# Patient Record
Sex: MALE
[De-identification: names, ages, dates, MRNs, and addresses within clinical notes are randomized per-mention and may not be internally consistent; named-entity substitution may affect disease eponyms.]

---

## 2021-12-17 ENCOUNTER — HOSPITAL ENCOUNTER (EMERGENCY)
Dept: HOSPITAL 5 - ED | Age: 31
LOS: 1 days | Discharge: LEFT BEFORE DISCHARGE | End: 2021-12-18
Payer: SELF-PAY

## 2021-12-17 ENCOUNTER — HOSPITAL ENCOUNTER (INPATIENT)
Dept: HOSPITAL 5 - ED | Age: 31
Discharge: LEFT BEFORE DISCHARGE | DRG: 387 | End: 2021-12-17
Attending: INTERNAL MEDICINE | Admitting: INTERNAL MEDICINE
Payer: SELF-PAY

## 2021-12-17 VITALS — SYSTOLIC BLOOD PRESSURE: 140 MMHG | DIASTOLIC BLOOD PRESSURE: 91 MMHG

## 2021-12-17 DIAGNOSIS — K51.90: Primary | ICD-10-CM

## 2021-12-17 DIAGNOSIS — Z53.21: ICD-10-CM

## 2021-12-17 DIAGNOSIS — D72.829: ICD-10-CM

## 2021-12-17 DIAGNOSIS — R10.9: Primary | ICD-10-CM

## 2021-12-17 LAB
ALBUMIN SERPL-MCNC: 3.8 G/DL (ref 3.9–5)
ALT SERPL-CCNC: 14 UNITS/L (ref 7–56)
BASOPHILS # (AUTO): 0 K/MM3 (ref 0–0.1)
BASOPHILS NFR BLD AUTO: 0.2 % (ref 0–1.8)
BUN SERPL-MCNC: 7 MG/DL (ref 9–20)
BUN/CREAT SERPL: 8 %
CALCIUM SERPL-MCNC: 8.9 MG/DL (ref 8.4–10.2)
EOSINOPHIL # BLD AUTO: 0 K/MM3 (ref 0–0.4)
EOSINOPHIL NFR BLD AUTO: 0.2 % (ref 0–4.3)
HCT VFR BLD CALC: 40.7 % (ref 35.5–45.6)
HEMOLYSIS INDEX: 56
HGB BLD-MCNC: 12.5 GM/DL (ref 11.8–15.2)
LYMPHOCYTES # BLD AUTO: 1 K/MM3 (ref 1.2–5.4)
LYMPHOCYTES NFR BLD AUTO: 6.5 % (ref 13.4–35)
MCHC RBC AUTO-ENTMCNC: 31 % (ref 32–34)
MCV RBC AUTO: 74 FL (ref 84–94)
MONOCYTES # (AUTO): 1 K/MM3 (ref 0–0.8)
MONOCYTES % (AUTO): 6.7 % (ref 0–7.3)
PLATELET # BLD: 187 K/MM3 (ref 140–440)
RBC # BLD AUTO: 5.49 M/MM3 (ref 3.65–5.03)

## 2021-12-17 PROCEDURE — 36415 COLL VENOUS BLD VENIPUNCTURE: CPT

## 2021-12-17 PROCEDURE — 74177 CT ABD & PELVIS W/CONTRAST: CPT

## 2021-12-17 PROCEDURE — 99285 EMERGENCY DEPT VISIT HI MDM: CPT

## 2021-12-17 PROCEDURE — 85025 COMPLETE CBC W/AUTO DIFF WBC: CPT

## 2021-12-17 PROCEDURE — 80053 COMPREHEN METABOLIC PANEL: CPT

## 2021-12-17 PROCEDURE — 83690 ASSAY OF LIPASE: CPT

## 2021-12-17 NOTE — EMERGENCY DEPARTMENT REPORT
ED Abdominal Pain HPI





- General


Chief Complaint: Abdominal Pain


Stated Complaint: ABDOMIANL PAIN


Time Seen by Provider: 12/17/21 11:39


Source: patient


Mode of arrival: Ambulatory


Limitations: No Limitations





- History of Present Illness


Initial Comments: 





Patient is a 30-year-old male presents emergency room complaints of lower 

abdominal pain that began yesterday.  He has associated nausea, vomiting, 

diarrhea.  He reports that he has been having intermittent bloody stools for the

last couple of months.  Patient states that he has a history of ulcerative 

colitis.  He states he supposed to be taking mesalamine 1.5 mg but due to lack 

of insurance he has not been taking the medication.  Patient states that his 

ulcerative colitis has previously been complicated by abscess and fistula.  He 

denies any pus drainage in his stools.  No allergies to medications.


Severity scale (0 -10): 0





- Related Data


                                    Allergies











Allergy/AdvReac Type Severity Reaction Status Date / Time


 


No Known Allergies Allergy   Unverified 12/17/21 11:16














ED Review of Systems


ROS: 


Stated complaint: ABDOMIANL PAIN


Other details as noted in HPI





Comment: All other systems reviewed and negative





ED Past Medical Hx





- Past Medical History


Previous Medical History?: Yes


Additional medical history: ULCERATIVE COLITIS





- Surgical History


Past Surgical History?: No





ED Physical Exam





- General


Limitations: No Limitations


General appearance: alert, in no apparent distress





- Head


Head exam: Present: atraumatic, normocephalic





- Eye


Eye exam: Present: normal appearance





- ENT


ENT exam: Present: mucous membranes moist





- Respiratory


Respiratory exam: Present: normal lung sounds bilaterally.  Absent: respiratory 

distress, wheezes, rales, rhonchi, stridor, chest wall tenderness, accessory 

muscle use, decreased breath sounds, prolonged expiratory





- Cardiovascular


Cardiovascular Exam: Present: regular rate, normal rhythm, normal heart sounds. 

Absent: systolic murmur, diastolic murmur, rubs, gallop





- GI/Abdominal


GI/Abdominal exam: Present: soft, tenderness (generalized lower quadrants), 

normal bowel sounds.  Absent: distended, guarding, rebound, rigid





- Neurological Exam


Neurological exam: Present: alert, oriented X3





- Psychiatric


Psychiatric exam: Present: normal affect, normal mood





- Skin


Skin exam: Present: warm, dry, intact





ED Course


                                   Vital Signs











  12/17/21





  11:18


 


Temperature 98.4 F


 


Pulse Rate 98 H


 


Respiratory 15





Rate 


 


Blood Pressure 140/91


 


O2 Sat by Pulse 97





Oximetry 














- Consultations


Consultation #1: 





12/17/21 14:18


Spoke to Dr. Beaver, general surgery regarding patient history and results who 

recommended GI consultation, hospitalist admission, and will consult on patient


12/17/21 14:32


spoke to Dr. Castellon, GI regarding patient history and results, she advised to order

stool samples, admit to hospitalist service, GI will consult on patient, advise 

no steroids at this time





12/17/21 14:39


Spoke to Dr. Urban, hospitalist will accept and resume care of patient, will 

admit to hospital service











ED Medical Decision Making





- Lab Data


Result diagrams: 


                                 12/17/21 12:02





                                 12/17/21 12:02








                                   Lab Results











  12/17/21 12/17/21 Range/Units





  12:02 12:02 


 


WBC  15.5 H   (4.5-11.0)  K/mm3


 


RBC  5.49 H   (3.65-5.03)  M/mm3


 


Hgb  12.5   (11.8-15.2)  gm/dl


 


Hct  40.7   (35.5-45.6)  %


 


MCV  74 L   (84-94)  fl


 


MCH  23 L   (28-32)  pg


 


MCHC  31 L   (32-34)  %


 


RDW  17.5 H   (13.2-15.2)  %


 


Plt Count  187   (140-440)  K/mm3


 


Lymph % (Auto)  6.5 L   (13.4-35.0)  %


 


Mono % (Auto)  6.7   (0.0-7.3)  %


 


Eos % (Auto)  0.2   (0.0-4.3)  %


 


Baso % (Auto)  0.2   (0.0-1.8)  %


 


Lymph # (Auto)  1.0 L   (1.2-5.4)  K/mm3


 


Mono # (Auto)  1.0 H   (0.0-0.8)  K/mm3


 


Eos # (Auto)  0.0   (0.0-0.4)  K/mm3


 


Baso # (Auto)  0.0   (0.0-0.1)  K/mm3


 


Seg Neutrophils %  86.4 H   (40.0-70.0)  %


 


Seg Neutrophils #  13.4 H   (1.8-7.7)  K/mm3


 


Sodium   133 L  (137-145)  mmol/L


 


Potassium   4.2  (3.6-5.0)  mmol/L


 


Chloride   98.1  ()  mmol/L


 


Carbon Dioxide   20 L  (22-30)  mmol/L


 


Anion Gap   19  mmol/L


 


BUN   7 L  (9-20)  mg/dL


 


Creatinine   0.9  (0.8-1.3)  mg/dL


 


Estimated GFR   > 60  ml/min


 


BUN/Creatinine Ratio   8  %


 


Glucose   109 H  ()  mg/dL


 


Calcium   8.9  (8.4-10.2)  mg/dL


 


Total Bilirubin   0.90  (0.1-1.2)  mg/dL


 


AST   21  (5-40)  units/L


 


ALT   14  (7-56)  units/L


 


Alkaline Phosphatase   103  ()  units/L


 


Total Protein   7.4  (6.3-8.2)  g/dL


 


Albumin   3.8 L  (3.9-5)  g/dL


 


Albumin/Globulin Ratio   1.1  %


 


Lipase   17  (13-60)  units/L











                                   Vital Signs











  12/17/21





  11:18


 


Temperature 98.4 F


 


Pulse Rate 98 H


 


Respiratory 15





Rate 


 


Blood Pressure 140/91


 


O2 Sat by Pulse 97





Oximetry 














- Radiology Data


Radiology results: report reviewed





Ordering Physician: STEFANIE BONILLA  


Date of Service: 12/17/21  


Procedure(s): CT abdomen pelvis w con  


Accession Number(s): [id number]  


 


cc: STEFANIE BONILLA   


 


 


CT ABDOMEN AND PELVIS WITH CONTRAST  


 


 INDICATION / CLINICAL INFORMATION: abd pain, n/v/d, bloody stool, hx of UC [facility]

300 100 ML.  


 


 TECHNIQUE: Axial CT images were obtained through the abdomen and pelvis after 

100 cc of Omnipaque 


300 IV contrast.  All CT scans at this location are performed using CT dose 

reduction for ALARA by 


means of automated exposure control.   


 


 COMPARISON: None available.  


 


 FINDINGS:  


 


 LOWER CHEST: No significant abnormality.  


 LIVER: No significant abnormality.  


 GALLBLADDER: No significant abnormality.    


 BILE DUCTS: No significant abnormality.  


 PANCREAS: No significant abnormality.  


 SPLEEN: No significant abnormality.  


 ADRENALS: No significant abnormality.  


 RIGHT KIDNEY / URETER: No significant abnormality.  


 LEFT KIDNEY / URETER: No significant abnormality.  


 


 STOMACH / SMALL BOWEL: . There is fat in the wall of the distal small bowel. 

This can be seen with 


chronic inflammation and can be seen in the setting of inflammatory bowel 

disease. There appears to 


be some mild wall thickening in the distal small bowel as well.  


 COLON: There is abnormal wall thickening in the rectum, sigmoid colon, left: 

This correlates with 


the patient's given history of ulcerative colitis.   


 APPENDIX: The appendix is abnormal in appearance. It is enlarged. There is 

periappendiceal 


inflammation. The appearance is characteristic for acute appendicitis.    


 PERITONEUM: No free fluid. No free air. No fluid collection.  


 LYMPH NODES: No significant adenopathy.  


 AORTA / ARTERIES: No significant abnormality.   


 IVC / VEINS: No significant abnormality.  


 


 URINARY BLADDER: No significant abnormality.  


 REPRODUCTIVE ORGANS: No significant abnormality.  


 


 ADDITIONAL FINDINGS: None.  


 


 SKELETAL SYSTEM: No acute abnormality  


 


 IMPRESSION:  


 1. The appendix is abnormal in appearance. The appendix is enlarged and there 

is periappendiceal 


inflammation. The appearance is characteristic of acute appendicitis. This is 

less specific in the 


setting of inflammatory bowel disease.  


 


 


 2. There is mild wall thickening in the rectum, sigmoid colon and left colon 

characteristic of 


colitis. This correlates with patient's given history of ulcerative colitis.  


 3. There is some mild wall thickening in the distal small bowel and there is 

some fat within the 


wall of the distal small bowel. This can be seen with chronic inflammation and 

can be seen in the 


setting of inflammatory bowel disease. This appearance would be more consistent 

with Crohn's disease


rather than ulcerative colitis.  


 


 Signer Name: Timothy Miranda MD   


 Signed: 12/17/2021 2:00 PM  


 Workstation Name: [id number]   


 


 


Transcribed By:   


Dictated By: Timothy Miranda MD  


Electronically Authenticated By: Timothy Miranda MD    


Signed Date/Time: 12/17/21 1400                                


 


 


 


DD/DT: 12/17/21 1351                                                            

 


TD/TT:





























- Medical Decision Making





Patient is a 30-year-old male presents emergency room complaints of lower 

abdominal pain that began yesterday.  He has associated nausea, vomiting, 

diarrhea.  He reports that he has been having intermittent bloody stools for the

last couple of months.  Patient states that he has a history of ulcerative 

colitis.  He states he supposed to be taking mesalamine 1.5 mg but due to lack 

of insurance he has not been taking the medication.  Patient states that his 

ulcerative colitis has previously been complicated by abscess and fistula.  He 

denies any pus drainage in his stools.  No allergies to medications.  Vitals are

stable.  On exam patient has generalized lower abdominal tenderness outpatient, 

no guarding, no rebound, no rigidity, no process, no peritoneal signs.  Lab 

significant for leukocytosis and dehydration. CT abdomen pelvis with IV 

contrast:   1. The appendix is abnormal in appearance. The appendix is enlarged 

and there is periappendiceal inflammation. The appearance is characteristic of 

acute appendicitis. This is less specific in the setting of inflammatory bowel 

disease.  


2. There is mild wall thickening in the rectum, sigmoid colon and left colon 

characteristic of colitis. This correlates with patient's given history of 

ulcerative colitis.   3. There is some mild wall thickening in the distal small 

bowel and there is some fat within the wall of the distal small bowel. This can 

be seen with chronic inflammation and can be seen in the setting of inflammatory

bowel disease. This appearance would be more consistent with Crohn's disease


rather than ulcerative colitis.   Spoke to Dr. Beaver, general surgery regarding 

patient history and results who recommended GI consultation, hospitalist 

admission, and will consult on patient. spoke to Dr. Castellon GI regarding patient 

history and results, she advised to order stool samples, admit to hospitalist 

service, GI will consult on patient, advise no steroids at this time. Spoke to 

Dr. Urban, hospitalist will accept and resume care of patient, will admit to hosp

ital service


Ordered for patient to have IV Zosyn and Flagyl.








I was advised by Dr. Castellon, GI specialist the patient was talking about leaving 

AMA.  I went to discuss with patient regarding the risk associated with doing 

so.  The patient is alert and oriented x3.  The patient exhibits decision-making

capacity.  The patient is free from distracting injury.  The risk of leaving 

without a complete medical examination, and AGAINST MEDICAL ADVICE, were 

explained to the patient, and they included death, disability, paralysis, 

permanent loss of quality of life, sepsis, bowel perforation, colostomy. patient

verbalized understanding to these and was able to articulate these risk in their

own words





Patient then asked if I would speak to his boss and he gave me permission to 

discuss his medical history with his boss, I discussed with his boss the 

seriousness of his condition and that he needed to stay, patient was then 

agreeable with staying and he was placed in main ED room 17 for admission














I was then advised by nurse that patient is again stating he wants to leave 

AGAINST MEDICAL ADVICE


Myself, Dr. Call, ER attending, Dr. Beaver, general surgeon all discussed the 

risks associated with patient and he still signed AMA


Critical care attestation.: 


If time is entered above; I have spent that time in minutes in the direct care 

of this critically ill patient, excluding procedure time.








ED Disposition


Clinical Impression: 


 Colitis, Nausea vomiting and diarrhea





Appendicitis


Qualifiers:


 Appendicitis type: acute appendicitis Acute appendicitis type: with localized 

peritonitis Appendicitis gangrene presence: without gangrene Appendicitis 

perforation presence: without perforation Appendicitis abscess presence: without

abscess Qualified Code(s): K35.30 - Acute appendicitis with localized 

peritonitis, without perforation or gangrene





Leukocytosis


Qualifiers:


 Leukocytosis type: unspecified Qualified Code(s): D72.829 - Elevated white 

blood cell count, unspecified





Disposition: 07 LEFT AGAINST MEDICAL ADVICE


Is pt being admited?: Yes


Does the pt Need Aspirin: No


Condition: Stable


Time of Disposition: 14:39

## 2021-12-17 NOTE — HISTORY AND PHYSICAL REPORT
History of Present Illness


Date of examination: 12/17/21


Date of admission: 


12/17/21 14:40








Medications and Allergies


                                    Allergies











Allergy/AdvReac Type Severity Reaction Status Date / Time


 


No Known Allergies Allergy   Unverified 12/17/21 11:16














Exam





- Constitutional


Vitals: 


                                        











Temp Pulse Resp BP Pulse Ox


 


 98.4 F   98 H  15   140/91   97 


 


 12/17/21 11:18  12/17/21 11:18  12/17/21 11:18  12/17/21 11:18  12/17/21 11:18














Results





- Labs


CBC & Chem 7: 


                                 12/17/21 12:02





                                 12/17/21 12:02


Labs: 


                             Laboratory Last Values











WBC  15.5 K/mm3 (4.5-11.0)  H  12/17/21  12:02    


 


RBC  5.49 M/mm3 (3.65-5.03)  H  12/17/21  12:02    


 


Hgb  12.5 gm/dl (11.8-15.2)   12/17/21  12:02    


 


Hct  40.7 % (35.5-45.6)   12/17/21  12:02    


 


MCV  74 fl (84-94)  L  12/17/21  12:02    


 


MCH  23 pg (28-32)  L  12/17/21  12:02    


 


MCHC  31 % (32-34)  L  12/17/21  12:02    


 


RDW  17.5 % (13.2-15.2)  H  12/17/21  12:02    


 


Plt Count  187 K/mm3 (140-440)   12/17/21  12:02    


 


Lymph % (Auto)  6.5 % (13.4-35.0)  L  12/17/21  12:02    


 


Mono % (Auto)  6.7 % (0.0-7.3)   12/17/21  12:02    


 


Eos % (Auto)  0.2 % (0.0-4.3)   12/17/21  12:02    


 


Baso % (Auto)  0.2 % (0.0-1.8)   12/17/21  12:02    


 


Lymph # (Auto)  1.0 K/mm3 (1.2-5.4)  L  12/17/21  12:02    


 


Mono # (Auto)  1.0 K/mm3 (0.0-0.8)  H  12/17/21  12:02    


 


Eos # (Auto)  0.0 K/mm3 (0.0-0.4)   12/17/21  12:02    


 


Baso # (Auto)  0.0 K/mm3 (0.0-0.1)   12/17/21  12:02    


 


Seg Neutrophils %  86.4 % (40.0-70.0)  H  12/17/21  12:02    


 


Seg Neutrophils #  13.4 K/mm3 (1.8-7.7)  H  12/17/21  12:02    


 


Sodium  133 mmol/L (137-145)  L  12/17/21  12:02    


 


Potassium  4.2 mmol/L (3.6-5.0)   12/17/21  12:02    


 


Chloride  98.1 mmol/L ()   12/17/21  12:02    


 


Carbon Dioxide  20 mmol/L (22-30)  L  12/17/21  12:02    


 


Anion Gap  19 mmol/L  12/17/21  12:02    


 


BUN  7 mg/dL (9-20)  L  12/17/21  12:02    


 


Creatinine  0.9 mg/dL (0.8-1.3)   12/17/21  12:02    


 


Estimated GFR  > 60 ml/min  12/17/21  12:02    


 


BUN/Creatinine Ratio  8 %  12/17/21  12:02    


 


Glucose  109 mg/dL ()  H  12/17/21  12:02    


 


Calcium  8.9 mg/dL (8.4-10.2)   12/17/21  12:02    


 


Total Bilirubin  0.90 mg/dL (0.1-1.2)   12/17/21  12:02    


 


AST  21 units/L (5-40)   12/17/21  12:02    


 


ALT  14 units/L (7-56)   12/17/21  12:02    


 


Alkaline Phosphatase  103 units/L ()   12/17/21  12:02    


 


Total Protein  7.4 g/dL (6.3-8.2)   12/17/21  12:02    


 


Albumin  3.8 g/dL (3.9-5)  L  12/17/21  12:02    


 


Albumin/Globulin Ratio  1.1 %  12/17/21  12:02    


 


Lipase  17 units/L (13-60)   12/17/21  12:02

## 2021-12-17 NOTE — CONSULTATION
History of Present Illness


Consult date: 12/17/21


Reason for consult: abdominal pain





- History of present illness


History of present illness: 





This is a 30-year-old male patient with complaints of right and left lower 

quadrant abdominal pain.  Patient has a past medical history significant for 

ulcerative colitis.  There is evidence on the CAT scan of active inflammation of

the rectum sigmoid descending colon's and also the ascending colon and cecum.  

There may be a dilated appendix.  The surgical consultation is to help to 

evaluate for acute appendicitis.





I have reviewed the CT scan and agree with the findings of a cecal inflammation 

and ascending colon inflammation.  However it is not clear to me that he is 

having active appendicitis and would prefer treating with the IV antibiotics and

start IV steroids as recommended by gastroenterology for his ulcerative colitis.





Medications and Allergies


                                    Allergies











Allergy/AdvReac Type Severity Reaction Status Date / Time


 


No Known Allergies Allergy   Unverified 12/17/21 11:16














Exam


                                   Vital Signs











Temp Pulse Resp BP Pulse Ox


 


 98.4 F   98 H  15   140/91   97 


 


 12/17/21 11:18  12/17/21 11:18  12/17/21 11:18  12/17/21 11:18  12/17/21 11:18














- General physical appearance


Positive: well developed, moderate distress, obese





- Eyes


Positive: PERRL, normal occular movement





- Neck


Positive: no masses, no bruits, trachea midline





- Respiratory


Positive: normal expansion





- Cardiovascular


Rhythm: regular





- Extremities


Extremities: no ischemia, pulses intact, No edema





- Abdomen


Abdomen: Present: soft, tender.  Absent: distended, masses, rebound, guarding, 

rigid, surgical scars


Hernia: none





- Neurologic


Neurologic: alert and oriented to time, place and person, motor strength and 

sensation are grossly intact, CN II-XII intact





- Psychiatric


Psychiatric: appropriate mood/affect, intact judgment & insight, cooperative





Results





- Labs





                                 12/17/21 12:02





                                 12/17/21 12:02


                              Abnormal lab results











  12/17/21 12/17/21 Range/Units





  12:02 12:02 


 


WBC  15.5 H   (4.5-11.0)  K/mm3


 


RBC  5.49 H   (3.65-5.03)  M/mm3


 


MCV  74 L   (84-94)  fl


 


MCH  23 L   (28-32)  pg


 


MCHC  31 L   (32-34)  %


 


RDW  17.5 H   (13.2-15.2)  %


 


Lymph % (Auto)  6.5 L   (13.4-35.0)  %


 


Lymph # (Auto)  1.0 L   (1.2-5.4)  K/mm3


 


Mono # (Auto)  1.0 H   (0.0-0.8)  K/mm3


 


Seg Neutrophils %  86.4 H   (40.0-70.0)  %


 


Seg Neutrophils #  13.4 H   (1.8-7.7)  K/mm3


 


Sodium   133 L  (137-145)  mmol/L


 


Carbon Dioxide   20 L  (22-30)  mmol/L


 


BUN   7 L  (9-20)  mg/dL


 


Glucose   109 H  ()  mg/dL


 


Albumin   3.8 L  (3.9-5)  g/dL








                                 Diabetes panel











  12/17/21 Range/Units





  12:02 


 


Sodium  133 L  (137-145)  mmol/L


 


Potassium  4.2  (3.6-5.0)  mmol/L


 


Chloride  98.1  ()  mmol/L


 


Carbon Dioxide  20 L  (22-30)  mmol/L


 


BUN  7 L  (9-20)  mg/dL


 


Creatinine  0.9  (0.8-1.3)  mg/dL


 


Glucose  109 H  ()  mg/dL


 


Calcium  8.9  (8.4-10.2)  mg/dL


 


AST  21  (5-40)  units/L


 


ALT  14  (7-56)  units/L


 


Alkaline Phosphatase  103  ()  units/L


 


Total Protein  7.4  (6.3-8.2)  g/dL


 


Albumin  3.8 L  (3.9-5)  g/dL








                                  Calcium panel











  12/17/21 Range/Units





  12:02 


 


Calcium  8.9  (8.4-10.2)  mg/dL


 


Albumin  3.8 L  (3.9-5)  g/dL








                                 Pituitary panel











  12/17/21 Range/Units





  12:02 


 


Sodium  133 L  (137-145)  mmol/L


 


Potassium  4.2  (3.6-5.0)  mmol/L


 


Chloride  98.1  ()  mmol/L


 


Carbon Dioxide  20 L  (22-30)  mmol/L


 


BUN  7 L  (9-20)  mg/dL


 


Creatinine  0.9  (0.8-1.3)  mg/dL


 


Glucose  109 H  ()  mg/dL


 


Calcium  8.9  (8.4-10.2)  mg/dL








                                  Adrenal panel











  12/17/21 Range/Units





  12:02 


 


Sodium  133 L  (137-145)  mmol/L


 


Potassium  4.2  (3.6-5.0)  mmol/L


 


Chloride  98.1  ()  mmol/L


 


Carbon Dioxide  20 L  (22-30)  mmol/L


 


BUN  7 L  (9-20)  mg/dL


 


Creatinine  0.9  (0.8-1.3)  mg/dL


 


Glucose  109 H  ()  mg/dL


 


Calcium  8.9  (8.4-10.2)  mg/dL


 


Total Bilirubin  0.90  (0.1-1.2)  mg/dL


 


AST  21  (5-40)  units/L


 


ALT  14  (7-56)  units/L


 


Alkaline Phosphatase  103  ()  units/L


 


Total Protein  7.4  (6.3-8.2)  g/dL


 


Albumin  3.8 L  (3.9-5)  g/dL














Assessment and Plan





I have reviewed the CT scan and agree with the findings of a cecal inflammation 

and ascending colon inflammation.  However it is not clear to me that he is 

having active appendicitis and would prefer treating with the IV antibiotics and

start IV steroids as recommended by gastroenterology for his ulcerative colitis.

## 2021-12-17 NOTE — GASTROENTEROLOGY CONSULTATION
History of Present Illness





- Reason for Consult


Consult date: 12/17/21


UC flare up


Requesting physician: SO ROSE





- History of Present Illness











This is a 31 yo male with pmh of UC here for abdominal pain, nausea/vomiting, 

and diarrhea. Currently not on any therapy for IBD. Recently moved to [place] and has

not had insurance for the past 6 months. Has not seen his GI doctor for the past

6 months. Previously was on mesalamine but has not been on it for 6 months. Re

ports having intermittent bloody diarrhea for the past 6 months along with lower

abdominal pain and vomiting. No fever/chills. 


CT showed enlarged appendix and periappendiceal inflammation, concerning for 

acute appendicitis, wall thickening in the rectum, sigmoid colon, and left 

colon, and distal small bowel. 





Medication list reviewed. 








Past History


Past Medical History: other (ulcerative colitis)





Medications and Allergies


                                    Allergies











Allergy/AdvReac Type Severity Reaction Status Date / Time


 


No Known Allergies Allergy   Unverified 12/17/21 11:16














Review of Systems





- Review of Systems


All systems: negative


Constitutional: no weight loss, no weight gain, no fever, no chills


Cardiovascular: no chest pain


Respiratory: no cough, no shortness of breath


Gastrointestinal: abdominal pain, nausea, vomiting, hematochezia, no BRBPR, no 

melena


Rectal: pain


Integumentary: no deferred


Neurological: no head injury


Psychiatric: no anxiety





Exam





- Constitutional


Vital Signs: 


                                        











Temp Pulse Resp BP Pulse Ox


 


 98.4 F   98 H  15   140/91   97 


 


 12/17/21 11:18  12/17/21 11:18  12/17/21 11:18  12/17/21 11:18  12/17/21 11:18











General appearance: no acute distress





- EENT


Eyes: EOM intact


ENT: hearing intact





- Respiratory


Respiratory effort: normal





- Cardiovascular


Rhythm: regular


Heart Sounds: Present: S1 & S2





- Gastrointestinal


General gastrointestinal: Present: soft, tender, non-distended





- Integumentary


Integumentary: Present: clear, warm





- Neurologic


Neurological: alert and oriented x3





- Labs


CBC & Chem 7: 


                                 12/17/21 12:02





                                 12/17/21 12:02


Lab Results: 


                         Laboratory Results - last 24 hr











  12/17/21 12/17/21





  12:02 12:02


 


WBC  15.5 H 


 


RBC  5.49 H 


 


Hgb  12.5 


 


Hct  40.7 


 


MCV  74 L 


 


MCH  23 L 


 


MCHC  31 L 


 


RDW  17.5 H 


 


Plt Count  187 


 


Lymph % (Auto)  6.5 L 


 


Mono % (Auto)  6.7 


 


Eos % (Auto)  0.2 


 


Baso % (Auto)  0.2 


 


Lymph # (Auto)  1.0 L 


 


Mono # (Auto)  1.0 H 


 


Eos # (Auto)  0.0 


 


Baso # (Auto)  0.0 


 


Seg Neutrophils %  86.4 H 


 


Seg Neutrophils #  13.4 H 


 


Sodium   133 L


 


Potassium   4.2


 


Chloride   98.1


 


Carbon Dioxide   20 L


 


Anion Gap   19


 


BUN   7 L


 


Creatinine   0.9


 


Estimated GFR   > 60


 


BUN/Creatinine Ratio   8


 


Glucose   109 H


 


Calcium   8.9


 


Total Bilirubin   0.90


 


AST   21


 


ALT   14


 


Alkaline Phosphatase   103


 


Total Protein   7.4


 


Albumin   3.8 L


 


Albumin/Globulin Ratio   1.1


 


Lipase   17














Assessment and Plan











# UC flare up


- question of appendicitis as well as Crohns instead of UC


- CT showed enlarged appendix and periappendiceal inflammation, concerning for 

acute appendicitis, wall thickening in the rectum, sigmoid colon, and left 

colon, and distal small bowel. 





Rec


- recommend admission for IV antibiotics and steroids. 


- IV antibiotics


- IV steroids if ok with surgery


- check stool studies to rule out C diff


- will follow.

## 2021-12-17 NOTE — CAT SCAN REPORT
CT ABDOMEN AND PELVIS WITH CONTRAST



INDICATION / CLINICAL INFORMATION: abd pain, n/v/d, bloody stool, hx of UC OMNI 300 100 ML.



TECHNIQUE: Axial CT images were obtained through the abdomen and pelvis after 100 cc of Omnipaque 300
 IV contrast.  All CT scans at this location are performed using CT dose reduction for ALARA by means
 of automated exposure control. 



COMPARISON: None available.



FINDINGS:



LOWER CHEST: No significant abnormality.

LIVER: No significant abnormality.

GALLBLADDER: No significant abnormality.  

BILE DUCTS: No significant abnormality.

PANCREAS: No significant abnormality.

SPLEEN: No significant abnormality.

ADRENALS: No significant abnormality.

RIGHT KIDNEY / URETER: No significant abnormality.

LEFT KIDNEY / URETER: No significant abnormality.



STOMACH / SMALL BOWEL: . There is fat in the wall of the distal small bowel. This can be seen with ch
ronic inflammation and can be seen in the setting of inflammatory bowel disease. There appears to be 
some mild wall thickening in the distal small bowel as well.

COLON: There is abnormal wall thickening in the rectum, sigmoid colon, left: This correlates with the
 patient's given history of ulcerative colitis. 

APPENDIX: The appendix is abnormal in appearance. It is enlarged. There is periappendiceal inflammati
on. The appearance is characteristic for acute appendicitis.  

PERITONEUM: No free fluid. No free air. No fluid collection.

LYMPH NODES: No significant adenopathy.

AORTA / ARTERIES: No significant abnormality. 

IVC / VEINS: No significant abnormality.



URINARY BLADDER: No significant abnormality.

REPRODUCTIVE ORGANS: No significant abnormality.



ADDITIONAL FINDINGS: None.



SKELETAL SYSTEM: No acute abnormality



IMPRESSION:

1. The appendix is abnormal in appearance. The appendix is enlarged and there is periappendiceal infl
ammation. The appearance is characteristic of acute appendicitis. This is less specific in the settin
g of inflammatory bowel disease.





2. There is mild wall thickening in the rectum, sigmoid colon and left colon characteristic of coliti
s. This correlates with patient's given history of ulcerative colitis.

3. There is some mild wall thickening in the distal small bowel and there is some fat within the wall
 of the distal small bowel. This can be seen with chronic inflammation and can be seen in the setting
 of inflammatory bowel disease. This appearance would be more consistent with Crohn's disease rather 
than ulcerative colitis.



Signer Name: Timothy Miranda MD 

Signed: 12/17/2021 2:00 PM

Workstation Name: [id number]

## 2021-12-19 ENCOUNTER — HOSPITAL ENCOUNTER (OUTPATIENT)
Dept: HOSPITAL 5 - ED | Age: 31
Setting detail: OBSERVATION
Discharge: HOME | End: 2021-12-19
Attending: INTERNAL MEDICINE | Admitting: STUDENT IN AN ORGANIZED HEALTH CARE EDUCATION/TRAINING PROGRAM
Payer: SELF-PAY

## 2021-12-19 ENCOUNTER — OUT OF OFFICE VISIT (OUTPATIENT)
Dept: URBAN - METROPOLITAN AREA MEDICAL CENTER 41 | Facility: MEDICAL CENTER | Age: 31
End: 2021-12-19
Payer: SELF-PAY

## 2021-12-19 VITALS — DIASTOLIC BLOOD PRESSURE: 92 MMHG | SYSTOLIC BLOOD PRESSURE: 152 MMHG

## 2021-12-19 DIAGNOSIS — E87.1: ICD-10-CM

## 2021-12-19 DIAGNOSIS — R11.2: ICD-10-CM

## 2021-12-19 DIAGNOSIS — I10: ICD-10-CM

## 2021-12-19 DIAGNOSIS — K51.90: Primary | ICD-10-CM

## 2021-12-19 DIAGNOSIS — Z98.890: ICD-10-CM

## 2021-12-19 DIAGNOSIS — E66.01: ICD-10-CM

## 2021-12-19 DIAGNOSIS — R10.9: ICD-10-CM

## 2021-12-19 DIAGNOSIS — Z79.899: ICD-10-CM

## 2021-12-19 DIAGNOSIS — Z87.19 H/O ACUTE PANCREATITIS: ICD-10-CM

## 2021-12-19 DIAGNOSIS — R10.84 ABDOMINAL CRAMPING, GENERALIZED: ICD-10-CM

## 2021-12-19 LAB
ALBUMIN SERPL-MCNC: 3.7 G/DL (ref 3.9–5)
ALT SERPL-CCNC: 12 UNITS/L (ref 7–56)
BASOPHILS # (AUTO): 0.1 K/MM3 (ref 0–0.1)
BASOPHILS NFR BLD AUTO: 1 % (ref 0–1.8)
BILIRUB DIRECT SERPL-MCNC: < 0.2 MG/DL (ref 0–0.2)
BUN SERPL-MCNC: 8 MG/DL (ref 9–20)
BUN/CREAT SERPL: 9 %
CALCIUM SERPL-MCNC: 8.7 MG/DL (ref 8.4–10.2)
EOSINOPHIL # BLD AUTO: 0.1 K/MM3 (ref 0–0.4)
EOSINOPHIL NFR BLD AUTO: 1.7 % (ref 0–4.3)
HCT VFR BLD CALC: 35.7 % (ref 35.5–45.6)
HEMOLYSIS INDEX: 22
HGB BLD-MCNC: 11.1 GM/DL (ref 11.8–15.2)
LYMPHOCYTES # BLD AUTO: 1.3 K/MM3 (ref 1.2–5.4)
LYMPHOCYTES NFR BLD AUTO: 21.3 % (ref 13.4–35)
MCHC RBC AUTO-ENTMCNC: 31 % (ref 32–34)
MCV RBC AUTO: 74 FL (ref 84–94)
MONOCYTES # (AUTO): 0.7 K/MM3 (ref 0–0.8)
MONOCYTES % (AUTO): 11 % (ref 0–7.3)
PLATELET # BLD: 245 K/MM3 (ref 140–440)
RBC # BLD AUTO: 4.8 M/MM3 (ref 3.65–5.03)

## 2021-12-19 PROCEDURE — G0378 HOSPITAL OBSERVATION PER HR: HCPCS

## 2021-12-19 PROCEDURE — 80048 BASIC METABOLIC PNL TOTAL CA: CPT

## 2021-12-19 PROCEDURE — 80076 HEPATIC FUNCTION PANEL: CPT

## 2021-12-19 PROCEDURE — 36415 COLL VENOUS BLD VENIPUNCTURE: CPT

## 2021-12-19 PROCEDURE — C9113 INJ PANTOPRAZOLE SODIUM, VIA: HCPCS

## 2021-12-19 PROCEDURE — 99284 EMERGENCY DEPT VISIT MOD MDM: CPT

## 2021-12-19 PROCEDURE — 83690 ASSAY OF LIPASE: CPT

## 2021-12-19 PROCEDURE — 96365 THER/PROPH/DIAG IV INF INIT: CPT

## 2021-12-19 PROCEDURE — 85025 COMPLETE CBC W/AUTO DIFF WBC: CPT

## 2021-12-19 PROCEDURE — 96361 HYDRATE IV INFUSION ADD-ON: CPT

## 2021-12-19 PROCEDURE — 99254 IP/OBS CNSLTJ NEW/EST MOD 60: CPT | Performed by: INTERNAL MEDICINE

## 2021-12-19 PROCEDURE — 96375 TX/PRO/DX INJ NEW DRUG ADDON: CPT

## 2021-12-19 NOTE — GASTROENTEROLOGY CONSULTATION
History of Present Illness





- Reason for Consult


Consult date: 12/19/21


IBD


Requesting physician: AMY J KOCHERLA





- History of Present Illness





The patient is a 31 yo male who presents with on going abdominal pain and bowel 

habit changes.  Patient with h/o IBD (UC by history, questionable crohn's based 

on recent ct scan results) who was diagnosed with UC in [place] ~3 years ago.  

previously on mesalamine but off meds recently.  he has had 6 months of daily 

abdominal pain, generalized but worse in right side of abdomen.  previously with

frequent bloody diarrhea but has been constipated for 2 days.  left AMA from 

hospital 2 days ago.  has a new job and does not have health insurance yet. 





Past History


Past Medical History: other (ulcerative colitis)


Past Surgical History: Other (anal fissurectomy)


Social history: denies: smoking, prescription drug abuse, IV drug use


Family history: no significant family history





Medications and Allergies


                                    Allergies











Allergy/AdvReac Type Severity Reaction Status Date / Time


 


No Known Allergies Allergy   Unverified 12/17/21 11:16











Active Meds: 


Active Medications





Methylprednisolone Sodium Succinate 60 mg/ Sodium Chloride  100 mls @ 200 mls/hr

IV DAILY OSCAR


Levofloxacin/Dextrose (Levaquin 750mg/150ml)  750 mg in 150 mls @ 100 mls/hr IV 

Q24H OSCAR; Protocol


Sodium Chloride (Nacl 0.9% 1000 Ml)  1,000 mls @ 100 mls/hr IV AS DIRECT OSCAR


Metronidazole (Metronidazole 500 Mg Tab)  500 mg PO Q8HR OSCAR; Protocol


   Stop: 12/24/21 13:59


Morphine Sulfate (Morphine 2 Mg/1 Ml Inj)  2 mg IV Q4H PRN


   PRN Reason: Pain, Moderate (4-6)





Reviewed/updated patient's home and current medications





Review of Systems





- Review of Systems


All systems: negative (per HPI)





Exam





- Constitutional


Vital Signs: 


                                        











Temp Pulse Resp BP Pulse Ox


 


 98.5 F   84   18   152/92   98 


 


 12/19/21 06:05  12/19/21 06:05  12/19/21 06:05  12/19/21 06:05  12/19/21 06:05











General appearance: no acute distress





- Respiratory


Respiratory effort: normal


Respiratory: bilateral: CTA





- Cardiovascular


Rhythm: regular


Heart Sounds: Present: S1 & S2





- Gastrointestinal


General gastrointestinal: Present: soft, tender (right sided and generalized 

ttp), non-distended





- Neurologic


Neurological: alert and oriented x3





- Psychiatric


Psychiatric: appropriate mood/affect





- Labs


CBC & Chem 7: 


                                 12/19/21 08:54





                                 12/19/21 08:54


Lab Results: 


                         Laboratory Results - last 24 hr











  12/19/21 12/19/21





  08:54 08:54


 


WBC  6.3 


 


RBC  4.80 


 


Hgb  11.1 L 


 


Hct  35.7 


 


MCV  74 L 


 


MCH  23 L 


 


MCHC  31 L 


 


RDW  17.3 H 


 


Plt Count  245 


 


Lymph % (Auto)  21.3 


 


Mono % (Auto)  11.0 H 


 


Eos % (Auto)  1.7 


 


Baso % (Auto)  1.0 


 


Lymph # (Auto)  1.3 


 


Mono # (Auto)  0.7 


 


Eos # (Auto)  0.1 


 


Baso # (Auto)  0.1 


 


Seg Neutrophils %  65.0 


 


Seg Neutrophils #  4.1 


 


Sodium   133 L


 


Potassium   3.8


 


Chloride   97.6 L


 


Carbon Dioxide   24


 


Anion Gap   15


 


BUN   8 L


 


Creatinine   0.9


 


Estimated GFR   > 60


 


BUN/Creatinine Ratio   9


 


Glucose   96


 


Calcium   8.7


 


Total Bilirubin   0.20


 


Direct Bilirubin   < 0.2


 


Indirect Bilirubin   0.0


 


AST   15


 


ALT   12


 


Alkaline Phosphatase   89


 


Total Protein   6.6


 


Albumin   3.7 L


 


Albumin/Globulin Ratio   1.3


 


Lipase   37














- Imaging


CT Scan: report reviewed





Assessment and Plan





1. History of IBD (UC by history, ? crohn's disease based on recent ct report)


2. Abdominal pain with bowel habit changes





-agree with steroid course/taper and empiric abx.  will need long term tx for 

IBD (likely biologic) but lack of health insurance will be a challenge.  cont 

current care for now and will follow.

## 2021-12-19 NOTE — CONSULTATION
History of Present Illness


Consult date: 12/19/21


Reason for consult: abdominal pain





- History of present illness


History of present illness: 





10-year-old male with a history of ulcerative colitis presents to the emergency 

room 2 days after leaving AMA for abdominal pain, diarrhea, and blood and mucus 

discharge from his rectum.  Of note patient was seen by the general surgeon on-c

all and the gastroenterologist on-call 2 days ago who recommended conservative 

management for his abdominal pain at the time.  Patient says that he had similar

symptoms 2 days ago but he left AMA due to personal obligations.  Patient says 

he is not feeling any worse but feels that he wants to get a full work-up since 

he is not feeling completely better.  Patient says that his abdominal pain is 

diffuse along the lower abdomen and lateral borders.  Patient says that he has 

had similar abdominal pain like this before but he is concerned since he has not

been able to take his IBD medications (mesalamine) for several months due to 

lack of insurance.  Patient denies any nausea vomiting currently says that he is

very hungry.  Patient says he has been unable to have a bowel movement since 

yesterday but is passing a lot of gas every time he sits on the toilet.  Patient

had a CT scan 2 days ago that showed inflammation of the cecum and ascending 

colon as well as periappendiceal inflammation, in addition to inflamed sigmoid 

and rectum wall findings.





Past History


Past Medical History: other (ulcerative colitis)


Past Surgical History: Other (anal fissurectomy)


Social history: denies: smoking, prescription drug abuse, IV drug use


Family history: no significant family history





Medications and Allergies


                                    Allergies











Allergy/AdvReac Type Severity Reaction Status Date / Time


 


No Known Allergies Allergy   Unverified 12/17/21 11:16











Active Meds: 


Active Medications





Methylprednisolone Sodium Succinate 60 mg/ Sodium Chloride  100 mls @ 200 mls/hr

IV DAILY OSCAR


Levofloxacin/Dextrose (Levaquin 750mg/150ml)  750 mg in 150 mls @ 100 mls/hr IV 

Q24H OSCAR; Protocol


Sodium Chloride (Nacl 0.9% 1000 Ml)  1,000 mls @ 100 mls/hr IV AS DIRECT OSCAR


Metronidazole (Metronidazole 500 Mg Tab)  500 mg PO Q8HR OSCAR; Protocol


   Stop: 12/24/21 13:59


Morphine Sulfate (Morphine 2 Mg/1 Ml Inj)  2 mg IV Q4H PRN


   PRN Reason: Pain, Moderate (4-6)











Review of Systems


All systems: negative





- Constitutional


no weight loss, no weight gain, no fever, no chills, no poor appetite





- Cardiovascular


no chest pain





- Gastrointestinal


abdominal pain, diarrhea, other (tenesmus), no nausea, no vomiting





- Genitourinary


no dysuria





Exam


                                   Vital Signs











Temp Pulse Resp BP Pulse Ox


 


 98.5 F   84   18   152/92   98 


 


 12/19/21 06:05  12/19/21 06:05  12/19/21 06:05  12/19/21 06:05  12/19/21 06:05














- General physical appearance


Positive: well developed, well nourished, no distress, moderate pain, other 

(Patient walked from the waiting room into exam room without any difficulty or 

signs of distress or significant pain.)





- Eyes


Positive: PERRL.  Negative: icteric





- Respiratory


Positive: normal expansion, normal respiratory effort





- Cardiovascular


Heart Sounds: Present: S1 & S2





- Extremities


Extremities: no ischemia





- Abdomen


Abdomen: Present: soft, other (mild generalized tenderness to deep palpation).  

Absent: distended, masses, rebound, guarding, rigid, wound





Results





- Labs





                                 12/19/21 08:54





                                 12/19/21 08:54


                              Abnormal lab results











  12/19/21 12/19/21 Range/Units





  08:54 08:54 


 


Hgb  11.1 L   (11.8-15.2)  gm/dl


 


MCV  74 L   (84-94)  fl


 


MCH  23 L   (28-32)  pg


 


MCHC  31 L   (32-34)  %


 


RDW  17.3 H   (13.2-15.2)  %


 


Mono % (Auto)  11.0 H   (0.0-7.3)  %


 


Sodium   133 L  (137-145)  mmol/L


 


Chloride   97.6 L  ()  mmol/L


 


BUN   8 L  (9-20)  mg/dL


 


Albumin   3.7 L  (3.9-5)  g/dL








                                 Diabetes panel











  12/19/21 Range/Units





  08:54 


 


Sodium  133 L  (137-145)  mmol/L


 


Potassium  3.8  (3.6-5.0)  mmol/L


 


Chloride  97.6 L  ()  mmol/L


 


Carbon Dioxide  24  (22-30)  mmol/L


 


BUN  8 L  (9-20)  mg/dL


 


Creatinine  0.9  (0.8-1.3)  mg/dL


 


Glucose  96  ()  mg/dL


 


Calcium  8.7  (8.4-10.2)  mg/dL


 


AST  15  (5-40)  units/L


 


ALT  12  (7-56)  units/L


 


Alkaline Phosphatase  89  ()  units/L


 


Total Protein  6.6  (6.3-8.2)  g/dL


 


Albumin  3.7 L  (3.9-5)  g/dL








                                  Calcium panel











  12/19/21 Range/Units





  08:54 


 


Calcium  8.7  (8.4-10.2)  mg/dL


 


Albumin  3.7 L  (3.9-5)  g/dL








                                 Pituitary panel











  12/19/21 Range/Units





  08:54 


 


Sodium  133 L  (137-145)  mmol/L


 


Potassium  3.8  (3.6-5.0)  mmol/L


 


Chloride  97.6 L  ()  mmol/L


 


Carbon Dioxide  24  (22-30)  mmol/L


 


BUN  8 L  (9-20)  mg/dL


 


Creatinine  0.9  (0.8-1.3)  mg/dL


 


Glucose  96  ()  mg/dL


 


Calcium  8.7  (8.4-10.2)  mg/dL








                                  Adrenal panel











  12/19/21 Range/Units





  08:54 


 


Sodium  133 L  (137-145)  mmol/L


 


Potassium  3.8  (3.6-5.0)  mmol/L


 


Chloride  97.6 L  ()  mmol/L


 


Carbon Dioxide  24  (22-30)  mmol/L


 


BUN  8 L  (9-20)  mg/dL


 


Creatinine  0.9  (0.8-1.3)  mg/dL


 


Glucose  96  ()  mg/dL


 


Calcium  8.7  (8.4-10.2)  mg/dL


 


Total Bilirubin  0.20  (0.1-1.2)  mg/dL


 


AST  15  (5-40)  units/L


 


ALT  12  (7-56)  units/L


 


Alkaline Phosphatase  89  ()  units/L


 


Total Protein  6.6  (6.3-8.2)  g/dL


 


Albumin  3.7 L  (3.9-5)  g/dL














Assessment and Plan





30-year-old male with a history of inflammatory bowel disease specifically 

diagnosed with ulcerative colitis in the past although CT scan findings from 2 

days ago show pathology consistent with possible Crohn's.  Patient is afebrile 

and stable with normalization of his leukocytosis since 2 days ago.  

Periappendiceal inflammation seen on CAT scan 2 days ago likely due to to 

surrounding inflammation with etiology probably inflammatory bowel disease.  No 

surgical intervention warranted at this time.  Agree with gastroenterology 

consultation recommendations from 2 days ago which included antibiotics and 

steroids.  Patient should resume outpatient follow-up to manage his symptoms, 

and resume his chronic medications.  Patient can have clear liquids.

## 2021-12-19 NOTE — HISTORY AND PHYSICAL REPORT
History of Present Illness


Date of examination: 12/19/21


Date of admission: 


12/19/2021


Chief complaint: 


Abdominal pain





History of present illness: 


30-year-old male with ulcerative colitis and hypertension who presents with 

severe erythema diffuse 8/10 abdominal pain and stools filled with mucus and 

blood.  Patient has had symptoms for the past several days.  He was evaluated 2 

days ago at this facility with surgical consult.  He left AGAINST MEDICAL ADVICE

after hospital admission and general surgeon consult.  He needed to arrange 

medical leave through his new job at [employer] in [place].


He recently moved from [place].  He has been without healthcare insurance.  For

6 months he has been unable to obtain his normal home medications.  He normally 

takes mesalamine lisinopril hydrochlorothiazide.


CT abdomen and pelvis findings consistent with inflammatory bowel disease.





CT abdomen pelvis obtained on 12/17 2 days ago


1. Appendix is abnormal in appearance.  Appendix is enlarged and there is 

periappendiceal inflammation.


2.  Bowel wall thickening rectum sigmoid colon left colon characteristic of 

colitis


3.  Mild wall thickening in the distal small bowel and some fat within the wall 

of the distal small bowel more consistent with Crohn's disease than 

ulcerativecolitis





At the time of my evaluation patient did not have any abdominal pain however 

complains of some abdominal discomfort


Denies fever, mild nausea vomiting, no diarrhea, Denies headache dizziness 

weakness or numbness











Past History


Past Medical History: hypertension, other (Ulcerative colitis, morbid obesity)


Past Surgical History: Other (Anal fissurectomy)


Social history: denies: smoking, alcohol abuse, prescription drug abuse


Family history: denies: no significant family history





Medications and Allergies


                                    Allergies











Allergy/AdvReac Type Severity Reaction Status Date / Time


 


No Known Allergies Allergy   Unverified 12/17/21 11:16











Active Meds: 


Active Medications





Methylprednisolone Sodium Succinate 60 mg/ Sodium Chloride  100 mls @ 200 mls/hr

IV DAILY OSCAR


Levofloxacin/Dextrose (Levaquin 750mg/150ml)  750 mg in 150 mls @ 100 mls/hr IV 

Q24H OSCAR; Protocol


Metronidazole (Metronidazole 500 Mg Tab)  500 mg PO Q8HR OSCAR; Protocol


   Stop: 12/24/21 13:59











Review of Systems


Constitutional: no weight loss, no weight gain, no fever, no chills


Ears, nose, mouth and throat: no nasal congestion, no nasal discharge


Cardiovascular: no chest pain, no orthopnea


Respiratory: no cough, no shortness of breath


Gastrointestinal: abdominal pain, nausea, vomiting, no hematemesis, no melena


Genitourinary Male: no dysuria, no hematuria


Musculoskeletal: no myalgias, no arthritis


Integumentary: no rash, no lesions


Neurological: no seizures, no syncope


Psychiatric: no anxiety, no depression


Endocrine: no cold intolerance, no heat intolerance


Hematologic/Lymphatic: no easy bruising, no easy bleeding


Allergic/Immunologic: no urticaria, no allergic rhinitis





Exam





- Constitutional


Vitals: 


                                        











Temp Pulse Resp BP Pulse Ox


 


 98.5 F   84   18   152/92   98 


 


 12/19/21 06:05  12/19/21 06:05  12/19/21 06:05  12/19/21 06:05  12/19/21 06:05











General appearance: Present: no acute distress, well-nourished, obese (Morbidly 

obese)





- EENT


Eyes: Present: PERRL, EOM intact





- Neck


Neck: Present: supple, normal ROM





- Respiratory


Respiratory effort: normal


Respiratory: bilateral: diminished, negative: rales, rhonchi, wheezing





- Cardiovascular


Rhythm: regular


Heart Sounds: Present: S1 & S2





- Extremities


Extremities: no ischemia, No edema





- Abdominal


General gastrointestinal: Present: soft, non-tender, non-distended, normal bowel

sounds





- Integumentary


Integumentary: Present: clear, warm





- Musculoskeletal


Musculoskeletal: strength equal bilaterally





- Psychiatric


Psychiatric: appropriate mood/affect, cooperative





- Neurologic


Neurologic: CNII-XII intact, moves all extremities





Results





- Labs


CBC & Chem 7: 


                                 12/19/21 08:54





                                 12/19/21 08:54


Labs: 


                              Abnormal lab results











  12/19/21 12/19/21 Range/Units





  08:54 08:54 


 


Hgb  11.1 L   (11.8-15.2)  gm/dl


 


MCV  74 L   (84-94)  fl


 


MCH  23 L   (28-32)  pg


 


MCHC  31 L   (32-34)  %


 


RDW  17.3 H   (13.2-15.2)  %


 


Mono % (Auto)  11.0 H   (0.0-7.3)  %


 


Sodium   133 L  (137-145)  mmol/L


 


Chloride   97.6 L  ()  mmol/L


 


BUN   8 L  (9-20)  mg/dL


 


Albumin   3.7 L  (3.9-5)  g/dL














Assessment and Plan


--Abdominal pain;


associated with sometimes nausea vomiting and diarrhea


Pain medications, antiemetics, IV fluids and supportive care





--Inflammatory bowel disease; possible Crohn's disease


Tapering dose of steroids, empiric antibiotics, IV fluids


Supportive care, GI consult





--Periappendiceal inflammation; on CT abdomen and pelvis on 12/17/2021;


Probably  due to inflammatory bowel disease


Continue steroids and empiric antibiotics and IV fluids





--Mild hyponatremia; IV normal saline


Closely monitor electrolytes





--History of hypertension; moderate control


Continue current antihypertensives, as needed hydralazine





--Morbid obesity; BMI 87.3


Advised diet modification exercise as tolerated and weight reduction when 

medically stable





--DVT prophylaxis; 


Lovenox subcu 





we will closely monitor patient and adjust management as needed


Follow GI and surgery evaluation and recommendations





Plan of care reviewed with the patient and his nurse





Disposition; follow clinically, follow GI and surgery evaluation and 

recommendations


Discharge when medically stable

## 2021-12-19 NOTE — EMERGENCY DEPARTMENT REPORT
ED Abdominal Pain HPI





- General


Chief Complaint: Abdominal Pain


Stated Complaint: ABDOMINAL PAIN


PUI?: No


Time Seen by Provider: 12/19/21 08:16


Source: patient, EMS


Mode of arrival: Stretcher


Limitations: No Limitations





- History of Present Illness


Initial Comments: 





Chief complaint abdominal pain





HPI: This is a 30-year-old male with ulcerative colitis and hypertension who 

presents with severe erythema diffuse 8/10 abdominal pain and stools filled with

mucus and blood.  Patient has had symptoms for the past several days.  He was 

evaluated 2 days ago at this facility with surgical consult.  He left AGAINST 

MEDICAL ADVICE after hospital admission and general surgeon consult.  He needed 

to arrange medical leave through his new job at [employer] in [place].








He recently moved from [place].  He has been without healthcare insurance.  For

6 months he has been unable to obtain his normal home medications.  He normally 

takes mesalamine lisinopril hydrochlorothiazide.





Upon review: General surgeon Dr. Beaver felt that patient had cecal inflammation 

and ascending colon inflammation.  It was not clear that patient had appe

ndicitis actively at that time.





Dr. Castellon gastroenterologist considered appendicitis versus Crohn's disease Dr. Garcia recommended IV antibiotics and IV steroids stool studies








EMR review: Radiology impression


CT abdomen pelvis obtained on 12/17 2 days ago





1. Appendix is abnormal in appearance.  Appendix is enlarged and there is 

periappendiceal inflammation.





2.  Bowel wall thickening rectum sigmoid colon left colon characteristic of 

colitis





3.  Mild wall thickening in the distal small bowel and some fat within the wall 

of the distal small bowel more consistent with Crohn's disease than ulcerative 

colitis


MD Complaint: abdominal pain


-: Gradual, days(s) (3 days)


Location: diffuse


Severity: severe


Severity scale (0 -10): 8


Quality: cramping


Consistency: constant


Worsens With: movement


Context: other (History of ulcerative colitis)


Associated Symptoms: constipation, other (Blood in stool, mucus in stool)





- Related Data


                                    Allergies











Allergy/AdvReac Type Severity Reaction Status Date / Time


 


No Known Allergies Allergy   Unverified 12/17/21 11:16














ED Review of Systems


ROS: 


Stated complaint: ABDOMINAL PAIN


Other details as noted in HPI





Comment: All other systems reviewed and negative


Constitutional: malaise.  denies: chills, fever


Respiratory: denies: cough, shortness of breath


Gastrointestinal: abdominal pain, nausea, constipation





ED Past Medical Hx





- Past Medical History


Previous Medical History?: Yes


Hx Hypertension: Yes


Additional medical history: ULCERATIVE COLITIS





- Surgical History


Past Surgical History?: Yes


Additional Surgical History: Fistulectomy





- Social History


Smoking Status: Never Smoker


Substance Use Type: None





ED Physical Exam





- General


Limitations: No Limitations


General appearance: alert, in no apparent distress





- Head


Head exam: Present: atraumatic, normocephalic





- Eye


Eye exam: Present: normal appearance





- ENT


ENT exam: Present: mucous membranes moist





- Neck


Neck exam: Present: normal inspection, full ROM





- Respiratory


Respiratory exam: Present: normal lung sounds bilaterally.  Absent: respiratory 

distress, wheezes, rales, rhonchi





- Cardiovascular


Cardiovascular Exam: Present: regular rate, normal rhythm, normal heart sounds. 

Absent: systolic murmur, diastolic murmur, rubs, gallop





- GI/Abdominal


GI/Abdominal exam: Present: soft, distended, normal bowel sounds.  Absent: 

tenderness, guarding





- Rectal


Rectal exam: Present: deferred





- Extremities Exam


Extremities exam: Present: normal inspection





- Neurological Exam


Neurological exam: Present: alert, oriented X3





- Psychiatric


Psychiatric exam: Present: normal affect, normal mood





- Skin


Skin exam: Present: warm, dry, intact, normal color.  Absent: rash





ED Course


                                   Vital Signs











  12/19/21





  06:05


 


Temperature 98.5 F


 


Pulse Rate 84


 


Respiratory 18





Rate 


 


Blood Pressure 152/92





[Left] 


 


O2 Sat by Pulse 98





Oximetry 














ED Medical Decision Making





- Lab Data


Result diagrams: 


                                 12/19/21 08:54





                                 12/19/21 08:54








                         Laboratory Results - last 24 hr











  12/19/21 12/19/21





  08:54 08:54


 


WBC  6.3 


 


RBC  4.80 


 


Hgb  11.1 L 


 


Hct  35.7 


 


MCV  74 L 


 


MCH  23 L 


 


MCHC  31 L 


 


RDW  17.3 H 


 


Plt Count  245 


 


Lymph % (Auto)  21.3 


 


Mono % (Auto)  11.0 H 


 


Eos % (Auto)  1.7 


 


Baso % (Auto)  1.0 


 


Lymph # (Auto)  1.3 


 


Mono # (Auto)  0.7 


 


Eos # (Auto)  0.1 


 


Baso # (Auto)  0.1 


 


Seg Neutrophils %  65.0 


 


Seg Neutrophils #  4.1 


 


Sodium   133 L


 


Potassium   3.8


 


Chloride   97.6 L


 


Carbon Dioxide   24


 


Anion Gap   15


 


BUN   8 L


 


Creatinine   0.9


 


Estimated GFR   > 60


 


BUN/Creatinine Ratio   9


 


Glucose   96


 


Calcium   8.7


 


Total Bilirubin   0.20


 


Direct Bilirubin   < 0.2


 


Indirect Bilirubin   0.0


 


AST   15


 


ALT   12


 


Alkaline Phosphatase   89


 


Total Protein   6.6


 


Albumin   3.7 L


 


Albumin/Globulin Ratio   1.3


 


Lipase   37














- Medical Decision Making





[facility] flare: IV fluid normal saline bolus initiated emergency department.  Patient 

received IV hydromorphone for pain relief.  Patient received IV Zofran, IV 

antibiotics metronidazole and Levaquin.  Also received IV steroid Solu-Medrol.





WBC 6K,  no discrete abdominal tenderness do not suspect appendicitis at this 

time.  Metronidazole to be given p.o. with nationwide shortage.


Critical care attestation.: 


If time is entered above; I have spent that time in minutes in the direct care 

of this critically ill patient, excluding procedure time.








ED Disposition


Clinical Impression: 


 Acute ulcerative colitis





Disposition: 09 ADMITTED AS INPATIENT


Is pt being admited?: Yes


Does the pt Need Aspirin: No


Condition: Stable